# Patient Record
Sex: FEMALE | Race: BLACK OR AFRICAN AMERICAN | NOT HISPANIC OR LATINO | Employment: PART TIME | URBAN - METROPOLITAN AREA
[De-identification: names, ages, dates, MRNs, and addresses within clinical notes are randomized per-mention and may not be internally consistent; named-entity substitution may affect disease eponyms.]

---

## 2017-01-12 ENCOUNTER — APPOINTMENT (OUTPATIENT)
Dept: WOMENS IMAGING | Facility: HOSPITAL | Age: 57
End: 2017-01-12

## 2017-01-12 PROCEDURE — 77063 BREAST TOMOSYNTHESIS BI: CPT | Performed by: RADIOLOGY

## 2017-01-12 PROCEDURE — G0202 SCR MAMMO BI INCL CAD: HCPCS | Performed by: RADIOLOGY

## 2017-01-12 PROCEDURE — 77067 SCR MAMMO BI INCL CAD: CPT | Performed by: RADIOLOGY

## 2017-11-21 PROBLEM — G47.419 NARCOLEPSY: Status: ACTIVE | Noted: 2017-11-21

## 2018-10-24 ENCOUNTER — APPOINTMENT (OUTPATIENT)
Dept: WOMENS IMAGING | Facility: HOSPITAL | Age: 58
End: 2018-10-24

## 2018-10-24 PROCEDURE — 77067 SCR MAMMO BI INCL CAD: CPT | Performed by: RADIOLOGY

## 2018-10-24 PROCEDURE — 77063 BREAST TOMOSYNTHESIS BI: CPT | Performed by: RADIOLOGY

## 2020-08-07 ENCOUNTER — APPOINTMENT (OUTPATIENT)
Dept: WOMENS IMAGING | Facility: HOSPITAL | Age: 60
End: 2020-08-07

## 2020-08-07 PROCEDURE — 77067 SCR MAMMO BI INCL CAD: CPT | Performed by: RADIOLOGY

## 2020-08-07 PROCEDURE — 77063 BREAST TOMOSYNTHESIS BI: CPT | Performed by: RADIOLOGY

## 2021-01-26 ENCOUNTER — OFFICE VISIT (OUTPATIENT)
Dept: GASTROENTEROLOGY | Facility: CLINIC | Age: 61
End: 2021-01-26

## 2021-01-26 DIAGNOSIS — K31.84 GASTROPARESIS: Primary | ICD-10-CM

## 2021-01-26 DIAGNOSIS — K59.04 CHRONIC IDIOPATHIC CONSTIPATION: ICD-10-CM

## 2021-01-26 DIAGNOSIS — K21.9 GASTROESOPHAGEAL REFLUX DISEASE WITHOUT ESOPHAGITIS: ICD-10-CM

## 2021-01-26 DIAGNOSIS — R07.89 ATYPICAL CHEST PAIN: ICD-10-CM

## 2021-01-26 PROBLEM — R07.9 CHEST PAIN: Status: ACTIVE | Noted: 2018-11-30

## 2021-01-26 PROBLEM — K22.70 BARRETT'S ESOPHAGUS: Status: ACTIVE | Noted: 2021-01-26

## 2021-01-26 PROBLEM — K59.00 CONSTIPATION: Status: ACTIVE | Noted: 2021-01-26

## 2021-01-26 PROBLEM — I34.1 MITRAL VALVE PROLAPSE: Status: ACTIVE | Noted: 2021-01-26

## 2021-01-26 PROBLEM — R29.810 FACIAL DROOP: Status: ACTIVE | Noted: 2020-11-06

## 2021-01-26 PROBLEM — J45.909 ASTHMA: Status: ACTIVE | Noted: 2021-01-26

## 2021-01-26 PROBLEM — D21.9 FIBROID: Status: ACTIVE | Noted: 2019-12-06

## 2021-01-26 PROBLEM — M54.50 LOW BACK PAIN: Status: ACTIVE | Noted: 2021-01-26

## 2021-01-26 PROBLEM — K58.9 IRRITABLE BOWEL SYNDROME: Status: ACTIVE | Noted: 2021-01-26

## 2021-01-26 PROBLEM — M79.673 FOOT PAIN: Status: ACTIVE | Noted: 2021-01-26

## 2021-01-26 PROBLEM — I10 BENIGN ESSENTIAL HYPERTENSION: Status: ACTIVE | Noted: 2018-11-30

## 2021-01-26 PROBLEM — R13.10 DYSPHAGIA: Status: ACTIVE | Noted: 2021-01-26

## 2021-01-26 PROBLEM — G43.909 MIGRAINE HEADACHE: Status: ACTIVE | Noted: 2021-01-26

## 2021-01-26 PROBLEM — B18.2 CHRONIC HEPATITIS C VIRUS INFECTION (HCC): Status: ACTIVE | Noted: 2021-01-26

## 2021-01-26 PROCEDURE — 99443 PR PHYS/QHP TELEPHONE EVALUATION 21-30 MIN: CPT | Performed by: NURSE PRACTITIONER

## 2021-01-26 RX ORDER — HYDROCODONE BITARTRATE AND ACETAMINOPHEN 5; 325 MG/1; MG/1
TABLET ORAL
COMMUNITY

## 2021-01-26 RX ORDER — GALCANEZUMAB 120 MG/ML
INJECTION, SOLUTION SUBCUTANEOUS
COMMUNITY
Start: 2020-12-28

## 2021-01-26 RX ORDER — PROMETHAZINE HYDROCHLORIDE 25 MG/1
25 TABLET ORAL
COMMUNITY
Start: 2020-11-25

## 2021-01-26 RX ORDER — DEXLANSOPRAZOLE 60 MG/1
60 CAPSULE, DELAYED RELEASE ORAL DAILY
Qty: 90 CAPSULE | Refills: 3 | Status: SHIPPED | OUTPATIENT
Start: 2021-01-26 | End: 2021-12-20

## 2021-01-26 RX ORDER — AMLODIPINE BESYLATE 5 MG/1
5 TABLET ORAL DAILY
COMMUNITY
Start: 2020-11-09

## 2021-01-26 RX ORDER — RANITIDINE 150 MG/1
150 CAPSULE ORAL DAILY
COMMUNITY
End: 2021-01-26 | Stop reason: SINTOL

## 2021-01-26 RX ORDER — NAPROXEN 500 MG/1
500 TABLET ORAL
COMMUNITY
Start: 2020-11-19

## 2021-01-26 RX ORDER — METOPROLOL SUCCINATE 50 MG/1
50 TABLET, EXTENDED RELEASE ORAL DAILY
COMMUNITY
Start: 2020-11-24

## 2021-01-26 NOTE — PROGRESS NOTES
Chief Complaint   Patient presents with   • Follow-up     constipation, reflux           History of Present Illness  Patient presents today for follow-up.  She was a patient of our previous office with a history of colon polyps, Smith's esophagus, hepatitis C, atypical chest pain, splenic lesion, alternating bowel habits and gastroparesis.  She has had previous pH impedance testing consistent with nonacid reflux.    She has a history of mild delay in gastric emptying and has been on Reglan 5 mg twice daily for symptom management in the past.    She presents today for follow-up.  She reports worsening acid reflux and constipation over the past 3 to 5 weeks.  She has a bowel movement daily but does not feel empty.  Symptoms started December 2020.  She does strain at times to have a bowel movement.  She denies melena or hematochezia.    She has tried over-the-counter Senokot taking 2 pills daily for 2 days but did not notice symptom improvement.  She then started MiraLAX 2 capfuls for a couple of days and felt that this was helping so she discontinued.  She is currently having a bowel movement every day but does not feel empty.  She has not tried lower dose of MiraLAX.    Also worsening acid reflux.  She has been on Zantac in the past as well as Pepcid most recently however this was discontinued after hospitalization November 2020 for acute onset of vision changes with speech difficulty and associated nausea.  Patient with right-sided facial droop and imaging was unremarkable.  She was diagnosed with complicated migraine and has started injection medication for migraine.  He was most recently seen by Cristine SOLORZANO with neurology November 19, 2020.    Given recent hospitalization and symptoms most of her medications have been discontinued.  She has been on metoclopramide 5 mg at bedtime but ran out of the medication approximately 4 weeks ago and scheduled this appointment.    She reports worsening reflux that  wakes her up from sleep.  She reports increased weight gain at approximately 240 pounds which she thinks is attributing to worsening reflux.  She can experience esophageal spasms or chest pressure at times.  This comes and goes.  No shortness of breath, chest pain with exertion or fever.    Previous esophageal manometry with abnormal lower esophageal sphincter and normal upper esophageal sphincter and normal body consistent with ineffective peristalsis globally.  Previously treated with Levsin as well as dicyclomine.  Also imipramine for atypical chest pain.  Followed by Dr. Zimmerman for history of hepatitis C.  She reports that she has completed treatment and is in remission.    You have chosen to receive care through a telephone visit.   Do you consent to use a telephone visit for your medical care today? Yes    Review of Systems   Constitutional: Positive for appetite change and unexpected weight change.   Neurological: Positive for headaches.         Result Review :        December 6, 2018.  5 mm polyp in the hepatic flexure.  3 mm polyp in the descending colon (tubular adenoma), mild nonspecific change (lamina propria edema).  A few small mouth diverticula in the sigmoid colon.  Mild nonbleeding internal hemorrhoids.  December 6, 2018 EGD.  Irregular Z-line.  Biopsies with reflux type change negative for Smith's esophagus or dysplasia.  Small bowel biopsies with mild nonspecific changes negative for increased intraepithelial lymphocytes negative for villous blunting, granulomas or significant organism.    September 23, 2015.  pH impedance testing with predominantly nonacid reflux without symptom correlation.     Physical Exam - TELEPHONE VISIT      Assessment and Plan    Diagnoses and all orders for this visit:    1. Gastroparesis (Primary)  -     dexlansoprazole (Dexilant) 60 MG capsule; Take 1 capsule by mouth Daily.  Dispense: 90 capsule; Refill: 3    2. Gastroesophageal reflux disease without esophagitis  -      dexlansoprazole (Dexilant) 60 MG capsule; Take 1 capsule by mouth Daily.  Dispense: 90 capsule; Refill: 3    3. Chronic idiopathic constipation    4. Atypical chest pain       Start Dexilant for longstanding acid reflux, atypical chest pain, gastroparesis and early satiety, new prescription sent to pharmacy.  Also emailed rebate card to email address.    Start MiraLAX daily, adjust dose as needed to help promote more regular and complete bowel movements.    We will contact your neurologist to see if a medication like metoclopramide can be restarted however given hospitalization November 2020, would need neurology clearance prior to restarting metoclopramide even low-dose.    Patient has had extensive previous work-up for atypical chest pain including pH impedance monitoring and esophageal manometry.  She has been treated in the past for atypical chest pain with dicyclomine, Levsin and imipramine.  Again would hold off on any of these medications and would like to see if Dexilant provides improvement in symptoms prior to considering medication that may have systemic side effect that could be confused with symptoms from recent hospitalization November 2020 which was ultimately attributed to complex migraine.  She has had change in bowel habits with worsening constipation since her previous medication was discontinued after hospitalization November 2020.  Discussed causes including discontinuation of medication regimen.    I Will contact neurologist Evie Carmona however dietary modifications including weight loss, aggressive treatment for acid reflux and maintaining upright position for gastroparesis and acid reflux after eating were strongly recommended and I am not sure that metoclopramide will be approved by neurology and if it is not, this is understandable and will discuss with patient and arrange follow-up based on neurology recommendations.  Patient verbalized agreement and understanding, all  questions answered and support provided.      This visit has been rescheduled as a phone visit to comply with patient safety concerns in accordance with CDC recommendations. Total time of discussion was 29 minutes.    ADDENDUM: 2/1/2021: 330pm  Louis from [facility] called back to say that the patient can take the metoprolol and reglan.  Informed patient via telephone.  She has not restarted Dexilant yet.  Recommend restarting Dexilant and seeing if overall fatigue and GI symptoms improve.  Also discussed alternative causes of symptoms and encouraged her to follow-up with primary care provider.    If symptoms do not improve with Dexilant, to consider low-dose metoclopramide as discussed. Jenae      Follow Up   No follow-ups on file.    Patient Instructions   For history of colon polyps recommend colonoscopy in 5 years, December 2023.

## 2021-02-01 ENCOUNTER — TELEPHONE (OUTPATIENT)
Dept: GASTROENTEROLOGY | Facility: CLINIC | Age: 61
End: 2021-02-01

## 2021-02-02 NOTE — TELEPHONE ENCOUNTER
[facility] called to say patient was cleared to resume medications (see Whit's note) she is aware.

## 2021-03-26 ENCOUNTER — BULK ORDERING (OUTPATIENT)
Dept: CASE MANAGEMENT | Facility: OTHER | Age: 61
End: 2021-03-26

## 2021-03-26 DIAGNOSIS — Z23 IMMUNIZATION DUE: ICD-10-CM

## 2021-08-30 ENCOUNTER — APPOINTMENT (OUTPATIENT)
Dept: WOMENS IMAGING | Facility: HOSPITAL | Age: 61
End: 2021-08-30

## 2021-08-30 PROCEDURE — 77063 BREAST TOMOSYNTHESIS BI: CPT | Performed by: RADIOLOGY

## 2021-08-30 PROCEDURE — 77067 SCR MAMMO BI INCL CAD: CPT | Performed by: RADIOLOGY

## 2021-12-20 DIAGNOSIS — K21.9 GASTROESOPHAGEAL REFLUX DISEASE WITHOUT ESOPHAGITIS: ICD-10-CM

## 2021-12-20 DIAGNOSIS — K31.84 GASTROPARESIS: ICD-10-CM

## 2021-12-20 RX ORDER — DEXLANSOPRAZOLE 60 MG/1
CAPSULE, DELAYED RELEASE ORAL
Qty: 90 CAPSULE | Refills: 3 | Status: SHIPPED | OUTPATIENT
Start: 2021-12-20

## 2022-02-11 ENCOUNTER — TELEPHONE (OUTPATIENT)
Dept: GASTROENTEROLOGY | Facility: CLINIC | Age: 62
End: 2022-02-11

## 2022-02-11 DIAGNOSIS — K21.9 GASTRIC REFLUX: Primary | ICD-10-CM

## 2022-02-11 RX ORDER — SUCRALFATE 1 G/1
1 TABLET ORAL 3 TIMES DAILY
Qty: 90 TABLET | Refills: 2 | Status: SHIPPED | OUTPATIENT
Start: 2022-02-11

## 2022-02-11 NOTE — TELEPHONE ENCOUNTER
Sucralfate liquid not covered by insurance, patient informed via telephone, prescription for sucralfate tablet sent electronically to pharmacy.  Patient can crush in 1 inch of water.    Rashaad

## 2022-02-11 NOTE — TELEPHONE ENCOUNTER
Patient is having really bad chest pain and went to the ER and they gave her the GI cocktail and that eased the pain but when it wares off it comes back. She was wanting to know if she she can get some more Carafate liquid to help with that.

## 2022-10-28 ENCOUNTER — APPOINTMENT (OUTPATIENT)
Dept: WOMENS IMAGING | Facility: HOSPITAL | Age: 62
End: 2022-10-28

## 2022-10-28 PROCEDURE — 77063 BREAST TOMOSYNTHESIS BI: CPT | Performed by: RADIOLOGY

## 2022-10-28 PROCEDURE — 77067 SCR MAMMO BI INCL CAD: CPT | Performed by: RADIOLOGY

## 2022-11-18 ENCOUNTER — APPOINTMENT (OUTPATIENT)
Dept: WOMENS IMAGING | Facility: HOSPITAL | Age: 62
End: 2022-11-18

## 2022-11-18 PROCEDURE — 77061 BREAST TOMOSYNTHESIS UNI: CPT | Performed by: RADIOLOGY

## 2022-11-18 PROCEDURE — 77065 DX MAMMO INCL CAD UNI: CPT | Performed by: RADIOLOGY

## 2022-11-18 PROCEDURE — 76642 ULTRASOUND BREAST LIMITED: CPT | Performed by: RADIOLOGY

## 2022-11-18 PROCEDURE — G0279 TOMOSYNTHESIS, MAMMO: HCPCS | Performed by: RADIOLOGY

## 2023-08-08 DIAGNOSIS — K31.84 GASTROPARESIS: ICD-10-CM

## 2023-08-08 DIAGNOSIS — K21.9 GASTROESOPHAGEAL REFLUX DISEASE WITHOUT ESOPHAGITIS: ICD-10-CM

## 2023-08-08 RX ORDER — DEXLANSOPRAZOLE 60 MG/1
CAPSULE, DELAYED RELEASE ORAL
Qty: 90 CAPSULE | Refills: 3 | OUTPATIENT
Start: 2023-08-08

## 2025-01-19 ENCOUNTER — HOSPITAL ENCOUNTER (EMERGENCY)
Facility: HOSPITAL | Age: 65
Discharge: HOME | End: 2025-01-19
Attending: EMERGENCY MEDICINE | Admitting: EMERGENCY MEDICINE
Payer: MEDICAID

## 2025-01-19 ENCOUNTER — APPOINTMENT (OUTPATIENT)
Dept: CT IMAGING | Facility: HOSPITAL | Age: 65
End: 2025-01-19
Payer: MEDICAID

## 2025-01-19 VITALS
RESPIRATION RATE: 16 BRPM | DIASTOLIC BLOOD PRESSURE: 77 MMHG | OXYGEN SATURATION: 96 % | HEIGHT: 60 IN | SYSTOLIC BLOOD PRESSURE: 135 MMHG | WEIGHT: 235 LBS | TEMPERATURE: 98.4 F | HEART RATE: 86 BPM | BODY MASS INDEX: 46.13 KG/M2

## 2025-01-19 DIAGNOSIS — K59.00 CONSTIPATION, UNSPECIFIED CONSTIPATION TYPE: Primary | ICD-10-CM

## 2025-01-19 LAB
ALBUMIN SERPL-MCNC: 4.2 G/DL (ref 3.5–5.2)
ALBUMIN/GLOB SERPL: 1.1 G/DL
ALP SERPL-CCNC: 78 U/L (ref 39–117)
ALT SERPL W P-5'-P-CCNC: 11 U/L (ref 1–33)
ANION GAP SERPL CALCULATED.3IONS-SCNC: 10.6 MMOL/L (ref 5–15)
AST SERPL-CCNC: 11 U/L (ref 1–32)
BASOPHILS # BLD AUTO: 0.01 10*3/MM3 (ref 0–0.2)
BASOPHILS NFR BLD AUTO: 0.1 % (ref 0–1.5)
BILIRUB SERPL-MCNC: 0.9 MG/DL (ref 0–1.2)
BILIRUB UR QL STRIP: NEGATIVE
BUN SERPL-MCNC: 20 MG/DL (ref 8–23)
BUN/CREAT SERPL: 19 (ref 7–25)
CALCIUM SPEC-SCNC: 9.7 MG/DL (ref 8.6–10.5)
CHLORIDE SERPL-SCNC: 100 MMOL/L (ref 98–107)
CLARITY UR: CLEAR
CO2 SERPL-SCNC: 29.4 MMOL/L (ref 22–29)
COLOR UR: YELLOW
CREAT SERPL-MCNC: 1.05 MG/DL (ref 0.57–1)
DEPRECATED RDW RBC AUTO: 45.4 FL (ref 37–54)
EGFRCR SERPLBLD CKD-EPI 2021: 60.2 ML/MIN/1.73
EOSINOPHIL # BLD AUTO: 0.17 10*3/MM3 (ref 0–0.4)
EOSINOPHIL NFR BLD AUTO: 2 % (ref 0.3–6.2)
ERYTHROCYTE [DISTWIDTH] IN BLOOD BY AUTOMATED COUNT: 13 % (ref 12.3–15.4)
GLOBULIN UR ELPH-MCNC: 3.9 GM/DL
GLUCOSE SERPL-MCNC: 102 MG/DL (ref 65–99)
GLUCOSE UR STRIP-MCNC: NEGATIVE MG/DL
HCT VFR BLD AUTO: 47 % (ref 34–46.6)
HGB BLD-MCNC: 15.2 G/DL (ref 12–15.9)
HGB UR QL STRIP.AUTO: NEGATIVE
IMM GRANULOCYTES # BLD AUTO: 0 10*3/MM3 (ref 0–0.05)
IMM GRANULOCYTES NFR BLD AUTO: 0 % (ref 0–0.5)
KETONES UR QL STRIP: NEGATIVE
LEUKOCYTE ESTERASE UR QL STRIP.AUTO: NEGATIVE
LIPASE SERPL-CCNC: 21 U/L (ref 13–60)
LYMPHOCYTES # BLD AUTO: 2.6 10*3/MM3 (ref 0.7–3.1)
LYMPHOCYTES NFR BLD AUTO: 30.6 % (ref 19.6–45.3)
MCH RBC QN AUTO: 30.5 PG (ref 26.6–33)
MCHC RBC AUTO-ENTMCNC: 32.3 G/DL (ref 31.5–35.7)
MCV RBC AUTO: 94.2 FL (ref 79–97)
MONOCYTES # BLD AUTO: 0.42 10*3/MM3 (ref 0.1–0.9)
MONOCYTES NFR BLD AUTO: 4.9 % (ref 5–12)
NEUTROPHILS NFR BLD AUTO: 5.3 10*3/MM3 (ref 1.7–7)
NEUTROPHILS NFR BLD AUTO: 62.4 % (ref 42.7–76)
NITRITE UR QL STRIP: NEGATIVE
PH UR STRIP.AUTO: 5.5 [PH] (ref 5–8)
PLATELET # BLD AUTO: 289 10*3/MM3 (ref 140–450)
PMV BLD AUTO: 10.7 FL (ref 6–12)
POTASSIUM SERPL-SCNC: 3.4 MMOL/L (ref 3.5–5.2)
PROT SERPL-MCNC: 8.1 G/DL (ref 6–8.5)
PROT UR QL STRIP: NEGATIVE
RBC # BLD AUTO: 4.99 10*6/MM3 (ref 3.77–5.28)
SODIUM SERPL-SCNC: 140 MMOL/L (ref 136–145)
SP GR UR STRIP: 1.01 (ref 1–1.03)
UROBILINOGEN UR QL STRIP: NORMAL
WBC NRBC COR # BLD AUTO: 8.5 10*3/MM3 (ref 3.4–10.8)

## 2025-01-19 PROCEDURE — 25010000002 HYDROMORPHONE PER 4 MG: Performed by: NURSE PRACTITIONER

## 2025-01-19 PROCEDURE — 74177 CT ABD & PELVIS W/CONTRAST: CPT

## 2025-01-19 PROCEDURE — 81003 URINALYSIS AUTO W/O SCOPE: CPT | Performed by: EMERGENCY MEDICINE

## 2025-01-19 PROCEDURE — 99285 EMERGENCY DEPT VISIT HI MDM: CPT

## 2025-01-19 PROCEDURE — 36415 COLL VENOUS BLD VENIPUNCTURE: CPT

## 2025-01-19 PROCEDURE — 96374 THER/PROPH/DIAG INJ IV PUSH: CPT

## 2025-01-19 PROCEDURE — 83690 ASSAY OF LIPASE: CPT | Performed by: NURSE PRACTITIONER

## 2025-01-19 PROCEDURE — 25510000001 IOPAMIDOL PER 1 ML: Performed by: EMERGENCY MEDICINE

## 2025-01-19 PROCEDURE — 25010000002 KETOROLAC TROMETHAMINE PER 15 MG: Performed by: NURSE PRACTITIONER

## 2025-01-19 PROCEDURE — 85025 COMPLETE CBC W/AUTO DIFF WBC: CPT | Performed by: NURSE PRACTITIONER

## 2025-01-19 PROCEDURE — 96375 TX/PRO/DX INJ NEW DRUG ADDON: CPT

## 2025-01-19 PROCEDURE — 25010000002 ONDANSETRON PER 1 MG: Performed by: NURSE PRACTITIONER

## 2025-01-19 PROCEDURE — 99284 EMERGENCY DEPT VISIT MOD MDM: CPT | Performed by: NURSE PRACTITIONER

## 2025-01-19 PROCEDURE — 80053 COMPREHEN METABOLIC PANEL: CPT | Performed by: NURSE PRACTITIONER

## 2025-01-19 RX ORDER — IOPAMIDOL 755 MG/ML
100 INJECTION, SOLUTION INTRAVASCULAR
Status: COMPLETED | OUTPATIENT
Start: 2025-01-19 | End: 2025-01-19

## 2025-01-19 RX ORDER — KETOROLAC TROMETHAMINE 30 MG/ML
30 INJECTION, SOLUTION INTRAMUSCULAR; INTRAVENOUS ONCE
Status: COMPLETED | OUTPATIENT
Start: 2025-01-19 | End: 2025-01-19

## 2025-01-19 RX ORDER — ONDANSETRON 2 MG/ML
4 INJECTION INTRAMUSCULAR; INTRAVENOUS ONCE
Status: COMPLETED | OUTPATIENT
Start: 2025-01-19 | End: 2025-01-19

## 2025-01-19 RX ORDER — SODIUM CHLORIDE 0.9 % (FLUSH) 0.9 %
10 SYRINGE (ML) INJECTION AS NEEDED
Status: DISCONTINUED | OUTPATIENT
Start: 2025-01-19 | End: 2025-01-20 | Stop reason: HOSPADM

## 2025-01-19 RX ORDER — HYDROMORPHONE HYDROCHLORIDE 1 MG/ML
0.5 INJECTION, SOLUTION INTRAMUSCULAR; INTRAVENOUS; SUBCUTANEOUS ONCE
Status: COMPLETED | OUTPATIENT
Start: 2025-01-19 | End: 2025-01-19

## 2025-01-19 RX ADMIN — IOPAMIDOL 100 ML: 755 INJECTION, SOLUTION INTRAVENOUS at 22:03

## 2025-01-19 RX ADMIN — ONDANSETRON 4 MG: 2 INJECTION, SOLUTION INTRAMUSCULAR; INTRAVENOUS at 20:34

## 2025-01-19 RX ADMIN — HYDROMORPHONE HYDROCHLORIDE 0.5 MG: 1 INJECTION, SOLUTION INTRAMUSCULAR; INTRAVENOUS; SUBCUTANEOUS at 21:35

## 2025-01-19 RX ADMIN — KETOROLAC TROMETHAMINE 30 MG: 30 INJECTION, SOLUTION INTRAMUSCULAR at 20:33

## 2025-01-20 NOTE — FSED PROVIDER NOTE
Subjective   History of Present Illness  64 yr old female presents C/O Right side pain that radiates around and into her RLQ that started yesterday.  She does not have a gallbladder. She reports that she has fibroids in her uterus and has gotten a couple a sharp pain in the vaginal area but has not had any today.  She has sharp pain in the right flank area with movement.  Denies N/V.   No fever or chills. No problems with urination.  She states that she had a sharp pain in her lower ABD 3 days ago and was constipated and took a laxative yesterday and then moved her Bowels.  She has not had a bowel movement since yesterday and was wondering if the sharp pain in right flank could be constipation again.         Review of Systems   Constitutional: Negative.    Respiratory: Negative.     Gastrointestinal:  Positive for abdominal pain. Negative for abdominal distention, diarrhea, nausea and vomiting.   Genitourinary:  Positive for flank pain and vaginal pain. Negative for decreased urine volume, dysuria, frequency, hematuria, vaginal bleeding and vaginal discharge.   Neurological: Negative.        Past Medical History:   Diagnosis Date    Abdominal cramping     Abdominal pain of multiple sites     Abdominal pain, epigastric     Abnormal findings on examination of gastrointestinal tract     Acute diarrhea     Allergic rhinitis     Anal fissure     Anal hemorrhage     Anemia     Anxiety     Arthritis     Asthma     Atypical chest pain     Atypical nevus     Smith's esophagus     Bloating     Blood clotting disorder     Chest pain     Chronic pain     Chronic viral hepatitis C     Cognitive impairment     Colon cancer screening     Constipation     Dehydration     Depression     Diffuse esophageal spasm     Early satiety     Endometrial mass     Endometrial polyp     Esophageal reflux     Fatigue     Fecal incontinence     Fibroid uterus     Fibromyalgia     Flu vaccine need     Gastroenteritis, acute     Gastroparesis      GERD (gastroesophageal reflux disease)     Heartburn     History of colon polyps     History of colonic polyps     History of dysphagia     Hypertension     Irritable bowel syndrome     Liver enlargement     Lower back pain     Migraine     Mild cognitive impairment with memory loss     Moderate nausea     Nausea     Odynophagia     Pelvic pain in female     Prolactinoma, benign     RSD (reflex sympathetic dystrophy)     Sleep apnea     Throat burning     Umbilical hernia     UTI symptoms     Vaginal discharge     Viral pneumonia        Allergies   Allergen Reactions    Imipramine Hives and Itching    Neosporin Original  [Bacitracin-Neomycin-Polymyxin] Itching    Oxycodone Nausea And Vomiting    Piroxicam Hives and Swelling    Tyloxapol Hives and Swelling    Zinc Itching    Neomycin-Bacitracin Zn-Polymyx Itching and Swelling    Oxycodone-Acetaminophen Itching    Sulfa Antibiotics Itching     Neosporin or eyedrops only       Past Surgical History:   Procedure Laterality Date     SECTION      CHOLECYSTECTOMY      COLONOSCOPY  10/11/2013    (Dr. Godfrey) 2mm polyp in recto-sigmoid colon, otherwise normal exam; PATH: hyperplastic polyp.     GALLBLADDER SURGERY      UPPER GASTROINTESTINAL ENDOSCOPY  2015    (Dr. Balderas) make no changes consistant with short segment Smith's esophagus, chronic gastritis; PATH: neg biopsies.        Family History   Problem Relation Age of Onset    Anemia Other     Depression Other     Arthritis Other     Stroke Other     COPD Other     Hypertension Other     Intestinal polyp Neg Hx     Colon cancer Neg Hx     Colon polyps Neg Hx        Social History     Socioeconomic History    Marital status:    Tobacco Use    Smoking status: Never    Smokeless tobacco: Never   Substance and Sexual Activity    Alcohol use: Yes     Comment: social    Drug use: Never    Sexual activity: Defer           Objective   Physical Exam  Vitals and nursing note reviewed.   Constitutional:        Appearance: Normal appearance.   Pulmonary:      Effort: Pulmonary effort is normal.      Breath sounds: Normal breath sounds and air entry.   Abdominal:      General: Abdomen is protuberant. Bowel sounds are normal.      Palpations: Abdomen is soft.      Tenderness: There is abdominal tenderness in the right lower quadrant, periumbilical area and suprapubic area. There is right CVA tenderness. There is no left CVA tenderness, guarding or rebound. Negative signs include Wall's sign.      Comments: Pt sitting up and had right leg off stretcher on floor.  She went to move leg upward onto the stretcher and this caused sharp pain into her right flank and lower back area.   Skin:     General: Skin is warm and dry.      Capillary Refill: Capillary refill takes less than 2 seconds.   Neurological:      Mental Status: She is alert.         Procedures           ED Course   Toradol & Zofran given IV for pain.  No change in pain. Pt is drinking contrast for CT scan.  Reviewed lab results with her.   Dilaudid 0.5 given.  Pain is gone.  Reviewed CT results with patient.  Will DC home.  Return if symptoms worsen after mag citrate completed or new symptoms.                                       Medical Decision Making      Final diagnoses:   Constipation, unspecified constipation type       ED Disposition  ED Disposition       ED Disposition   Discharge    Condition   Stable    Comment   --               Liliya Desai MD  [address] [facility] [address] [phone]      As needed, If symptoms worsen         Medication List        New Prescriptions      magnesium citrate solution  Take 148 mL by mouth 1 (One) Time for 1 dose. If no results after 2-3 hours take rest of the bottle.               Where to Get Your Medications        These medications were sent to [address] - [address] [facility] - [phone]  - [phone] FX  [address] [facility]  [address]      Phone: [phone]   magnesium citrate solution

## 2025-01-20 NOTE — DISCHARGE INSTRUCTIONS
Take mag citrate as directed.  Follow up with your primary care as needed.  If after mag citrate your symptoms persist and fever > 100.4 return to the ER.

## 2025-02-03 ENCOUNTER — TELEPHONE (OUTPATIENT)
Dept: GASTROENTEROLOGY | Facility: CLINIC | Age: 65
End: 2025-02-03
Payer: COMMERCIAL

## 2025-02-03 NOTE — TELEPHONE ENCOUNTER
Patient calls stating that she was a patient of [facility] x 10 years until [facility] acquired the practice and she was forced to stay with a [facility] MD for tier one care.  She is going to be covered by [employer] at the end of February and would like to return to this practice.  Was told by scheduling dept she could not return.  She would like to speak with someone regarding this issue.

## 2025-05-01 ENCOUNTER — OFFICE VISIT (OUTPATIENT)
Dept: GASTROENTEROLOGY | Facility: CLINIC | Age: 65
End: 2025-05-01
Payer: COMMERCIAL

## 2025-05-01 VITALS
OXYGEN SATURATION: 97 % | WEIGHT: 233.3 LBS | BODY MASS INDEX: 45.8 KG/M2 | TEMPERATURE: 97.7 F | HEART RATE: 82 BPM | SYSTOLIC BLOOD PRESSURE: 116 MMHG | HEIGHT: 60 IN | DIASTOLIC BLOOD PRESSURE: 74 MMHG

## 2025-05-01 DIAGNOSIS — Z86.0100 HISTORY OF COLON POLYPS: ICD-10-CM

## 2025-05-01 DIAGNOSIS — Z98.890 STATUS POST DILATION OF ESOPHAGEAL NARROWING: ICD-10-CM

## 2025-05-01 DIAGNOSIS — Z87.19 STATUS POST DILATION OF ESOPHAGEAL NARROWING: ICD-10-CM

## 2025-05-01 DIAGNOSIS — K31.1 PYLORIC STENOSIS IN ADULT: ICD-10-CM

## 2025-05-01 DIAGNOSIS — K22.70 BARRETT'S ESOPHAGUS WITHOUT DYSPLASIA: ICD-10-CM

## 2025-05-01 DIAGNOSIS — K59.04 CHRONIC IDIOPATHIC CONSTIPATION: Primary | ICD-10-CM

## 2025-05-01 DIAGNOSIS — K21.9 GASTRIC REFLUX: ICD-10-CM

## 2025-05-01 DIAGNOSIS — K31.84 GASTROPARESIS: ICD-10-CM

## 2025-05-01 RX ORDER — ASPIRIN 81 MG/1
1 TABLET ORAL DAILY
COMMUNITY
Start: 2024-12-16

## 2025-05-01 RX ORDER — FUROSEMIDE 40 MG/1
40 TABLET ORAL
COMMUNITY
Start: 2024-11-06

## 2025-05-01 RX ORDER — PREGABALIN 25 MG/1
CAPSULE ORAL
COMMUNITY
Start: 2025-02-11

## 2025-05-01 RX ORDER — VONOPRAZAN FUMARATE 13.36 MG/1
10 TABLET ORAL DAILY
Qty: 90 TABLET | Refills: 2 | Status: SHIPPED | OUTPATIENT
Start: 2025-05-01

## 2025-05-01 RX ORDER — CYCLOBENZAPRINE HCL 10 MG
10 TABLET ORAL 3 TIMES DAILY PRN
COMMUNITY

## 2025-05-01 RX ORDER — HYDROCHLOROTHIAZIDE 25 MG/1
25 TABLET ORAL
COMMUNITY
Start: 2024-12-16

## 2025-05-01 RX ORDER — POTASSIUM CHLORIDE 1500 MG/1
20 TABLET, EXTENDED RELEASE ORAL
COMMUNITY
Start: 2024-12-16

## 2025-05-01 RX ORDER — VONOPRAZAN FUMARATE 13.36 MG/1
TABLET ORAL
COMMUNITY
Start: 2025-04-10 | End: 2025-05-01 | Stop reason: SDUPTHER

## 2025-05-01 RX ORDER — HYDRALAZINE HYDROCHLORIDE 50 MG/1
50 TABLET, FILM COATED ORAL
COMMUNITY
Start: 2024-12-16

## 2025-05-01 RX ORDER — SUCRALFATE 1 G/1
1 TABLET ORAL 3 TIMES DAILY
Qty: 90 TABLET | Refills: 2 | Status: SHIPPED | OUTPATIENT
Start: 2025-05-01

## 2025-05-01 NOTE — PATIENT INSTRUCTIONS
Treatment Plan:  Gastroparesis: Avoid nasal spray for gastroparesis, consider repeat emptying test or referral to a specialist if symptoms persist.  Constipation: Take MiraLAX and prune juice every other day, track bowel movements with a provided calendar, avoid probiotics.  GERD: Continue taking Voquezna, double the dose during flare-ups, use sucralfate tablets as needed, continue using Phenergan for nausea.  Weight management: Follow up with cardiologist in June 2025, discuss the HMR program, engage in regular physical activity, reduce portion sizes during meals.  Health Maintenance: Follow-up colonoscopy scheduled for June 2026, hepatitis C completely treated and resolved.     Follow-Up Instructions:  Take MiraLAX and prune juice every other day to regulate bowel movements.  Track bowel movements using the provided calendar.  Continue taking Voquezna for reflux and double the dose during flare-ups.  Use sucralfate tablets as needed, crush them if necessary.  Follow up with cardiologist in June 2025.  Discuss the HMR program with the cardiologist.  Engage in regular physical activity and reduce portion sizes during meals.  Schedule follow-up visit in 6 months.     Additional Notes:  She typically eats once a day and avoids dairy products, preferring coconut-based foods.  The patient has a nonprofit foundation and travels frequently.

## 2025-05-01 NOTE — PROGRESS NOTES
Chief Complaint   Patient presents with    Abdominal Pain             GASTROENTEROLOGY SUMMARY    64-year-old female presents today for follow-up.  Last office visit 1/26/2021, she was also a patient of our previous office.  She has been following with [facility] gastro[facility]ology, last office visit 8/28/2024 RASHAD Arreola.  History of colon polyps, Smith's esophagus, alternating constipation and diarrhea, atypical chest pain, mild gastroparesis, chronic nausea, acid reflux and history of hepatitis C treated with Dr. Zimmerman years ago (HCV quantitative RNA are - 11/18/2024).  Previous pH impedance testing consistent with nonacid reflux.  Previous esophageal manometry with abnormal lower esophageal sphincter and normal upper esophageal sphincter and normal body consistent with ineffective peristalsis globally.  Previously treated with Levsin as well as dicyclomine.   Also imipramine for atypical chest pain.   Previous cholecystectomy.  Previous colon transit study normal, anorectal manometry has been recommended in the past, patient has deferred.    Last EGD and colonoscopy 6/14/2023 Dr. Barragan (findings/pathology results summarized below):  Recommended for repeat EGD and colonoscopy at 3-year interval, 6/2026.    2/8/2025 CT scan abdomen pelvis:  Impression:   1. No acute findings in the abdomen or pelvis.   2. No nephrolithiasis.   3. Hepatomegaly.     Started on Voquenza September 5, 2024.  Previous use of Dexilant worked well but was no longer covered by insurance    Previous treatments for constipation:  MiraLAX and Linzess did not provide improvement in symptoms.  Has used Senokot 2 tablets as needed in the past with some success.  History of Present Illness  The patient is a 64-year-old female who presents today for follow-up.    Gastroparesis  She has been diagnosed with gastroparesis and experienced adverse reactions to Reglan, including swelling and twitching, leading to its  discontinuation.   Currently, she is not on any medication for gastroparesis and has not undergone a recent gastric emptying test.   She occasionally experiences nausea, which is less frequent than before.   She was previously prescribed anti-nausea medication due to opioid use but has not required it since her ER visit.   She reports no difficulty swallowing or sensation of food being stuck.   She has not been prescribed Wegovy or semaglutide due to potential exacerbation of her gastroparesis.  - Onset: Diagnosed with gastroparesis; adverse reactions to Reglan.  - Character: Occasional nausea, swelling, and twitching.  - Alleviating/Aggravating Factors: Discontinuation of Reglan; not on any medication for gastroparesis; avoidance of Wegovy or semaglutide.  - Severity: Less frequent nausea; no difficulty swallowing or sensation of food being stuck.    Constipation  She has been experiencing constipation, which worsens her back pain if not managed. She manages her bowel movements with Metamucil and MiraLAX, taking them every 3 to 4 days. She also uses prune juice in conjunction with MiraLAX, which induces bowel movements within an hour. Her primary care physician has advised her to increase her fiber intake. She has previously tried Linzess and MiraLAX alone, but neither provided relief.  - Onset: Ongoing constipation.  - Character: Constipation worsens back pain.  - Alleviating/Aggravating Factors: Managed with Metamucil, MiraLAX, and prune juice; advised to increase fiber intake.  - Timing: Takes Metamucil and MiraLAX every 3 to 4 days; prune juice induces bowel movements within an hour.  - Severity: Linzess and MiraLAX alone did not provide relief.    Reflux  She has been managing her reflux with Voquezna, which she finds effective.   She occasionally doubles her dose to 10 mg for 1 or 2 days during flare-ups, which typically resolves the issue.   She has previously used Pepcid and famotidine.   She has a history  of atypical chest pain and was last seen in the ER in 11/2024.   She has found liquid Carafate to be the most effective treatment for this condition, but due to insurance coverage issues, she now uses Carafate tablets as needed.  - Onset: History of atypical chest pain; last ER visit in 11/2024.  - Character: Reflux managed with Voquezna; occasional flare-ups.  - Alleviating/Aggravating Factors: Effective management with Voquezna; occasional doubling of dose; liquid Carafate most effective but uses tablets due to insurance issues.  - Timing: Doubles Voquezna dose for 1 or 2 days during flare-ups.  - Severity: Effective management with Voquezna; occasional flare-ups.    Weight Gain and Elevated A1c Levels  She has experienced significant weight gain and elevated A1c levels.   Her primary care physician has advised against weight loss medications due to her diastolic heart failure.   She is considering the HMR program at [facility], which she has found beneficial in the past.   However, she is concerned about the sodium content in prepackaged meals affecting her heart condition.   She resides in [place] and is unable to find a similar program locally.   She is not on a restricted sodium diet.   She typically eats once a day and does not consume large meals. She avoids dairy products and prefers coconut-based foods.  - Onset: Significant weight gain and elevated A1c levels.  - Character: Weight gain and elevated A1c levels.  - Alleviating/Aggravating Factors: Advised against weight loss medications; considering HMR program; concerned about sodium content in prepackaged meals.  - Timing: Typically eats once a day; avoids dairy products.  - Severity: Not on a restricted sodium diet.    PET Stress Test  She is scheduled for a PET stress test but has been unable to complete it due to illness and insurance issues.   She is scheduled to see her cardiologist again in 06/2025.  - Onset: Scheduled for PET stress test.  -  Alleviating/Aggravating Factors: Unable to complete due to illness and insurance issues.  - Timing: Scheduled to see cardiologist again in 06/2025.    PAST SURGICAL HISTORY:  Upper scope and colonoscopy on 06/14/2023 with removal of precancerous polyps and dilation of esophagus and pylorus.    SOCIAL HISTORY  Exercise: No exercise currently, but previously exercised in [place].  Diet: Eats out on weekends, tries to eat healthy, avoids dairy, consumes coconut-based products.          Result Review :       -EGD 12/20/2012: Esophageal mucosa changes. Acute Gastritis. Path: consistent with Smith's Esophagus, +IM.  -EGD 10/11/2013: Normal Esophagus, acute gastritis. No metaplasia  -Colonoscopy 10/11/2013: 2 mm sessile hyperplastic polyp in the recto-sigmoid colon.   -EGD 5/8/2015: Chronic Gastritis. Mild erosive gastropathy. No Dysplasia  -EGD 4/5/2017: Esophageal mucosa changes. Erythematous mucosa of stomach. Active gastritis. Mild reflux esophagitis, Negative for Dysplasia   - Colonoscopy 12/6/2018 Dr. Balderas:  5 mm polyp hepatic flexure- TA   3 mm polyp descending colon - TA  Small mouth diverticula sigmoid colon  Mild internal hemorrhoids    EGD and colonoscopy 6/14/2023 Dr. Barragan.  EGD:  - Tight circumferentially folded mucosa in the esophagus. Dilated.  - Z-line irregular, 38 cm from the incisors.  - Small hiatal hernia. Sliding type, noted only intermittently.  - Bilious gastric fluid in mild residual amounts.  - Erythematous and eroded mucosa in the antrum.   - One mucosal papule (nodule) found in the stomach.   - Gastric stenosis was found at the pylorus. Dilated to 15 mm-to consider further dilation/larger dilation if dyspeptic symptoms and gastroparesis symptoms are not adequately improved after dilation and dietary and lifestyle modifications.  Also to consider consultation with a surgeon experienced in pyloroplasty.  Colonoscopy:  - The examined portion of the ileum was normal.  - Normal mucosa in  "the entire examined colon.  - One 1 mm polyp in the cecum - TA  - Two 1 mm polyps in the transverse colon - TA  forceps. Resected and retrieved.  - One <1 mm polyp at the recto-sigmoid colon - colonic mucosa with focal hyperplastic change  - Mild non-bleeding internal hemorrhoids.  PATHOLOGY:   - Normal small bowel biopsies  - Gastric biopsy: Focal ulcer, chronic inactive gastritis with reactive changes, no H. pylori  -Lower esophageal biopsy mild chronic inflammation, reflux esophagitis, no IM  -Mid and proximal esophagus reactive changes and mild chronic inflammation  -Cecum polyp-TA  -Transverse colon polyps x 2-TA  -Rectosigmoid colon polyp-colonic mucosa with focal hyperplastic change  Recommended for repeat EGD and colonoscopy at 3-year interval, 6/2026.    -Gastric Emptying Study 8/24/2015 - Normal Gastric emptying after 2 hrs, delay in gastric emptying after 4 hrs.     REVIEWED PERTINENT RESULTS/ LABS  No results found for: \"CASEREPORT\", \"FINALDX\"  Lab Results   Component Value Date    HGB 15.2 01/19/2025    MCV 94.2 01/19/2025     01/19/2025    ALT 9 02/08/2025    AST 14 02/08/2025    HGBA1C 6.1 (H) 04/11/2025    INR 0.96 06/07/2023    TRIG 61 03/30/2023      Vital Signs:   /74   Pulse 82   Temp 97.7 °F (36.5 °C)   Ht 152.4 cm (60\")   Wt 106 kg (233 lb 4.8 oz)   SpO2 97%   BMI 45.56 kg/m²     Body mass index is 45.56 kg/m².     Physical Exam  Vitals reviewed.   Constitutional:       General: She is not in acute distress.     Appearance: Normal appearance. She is not ill-appearing or toxic-appearing.   Eyes:      General: No scleral icterus.  Pulmonary:      Effort: Pulmonary effort is normal. No respiratory distress.   Skin:     Coloration: Skin is not jaundiced.   Neurological:      Mental Status: She is alert and oriented to person, place, and time.   Psychiatric:         Mood and Affect: Mood normal.         Behavior: Behavior normal.         Thought Content: Thought content normal.    "      Judgment: Judgment normal.         Assessment and Plan        Diagnoses and all orders for this visit:    1. Chronic idiopathic constipation (Primary)    2. Smith's esophagus without dysplasia    3. Gastric reflux  -     sucralfate (CARAFATE) 1 g tablet; Take 1 tablet by mouth 3 (Three) Times a Day. Okay to crush in 1 inch of water and swallow  Dispense: 90 tablet; Refill: 2  -     Voquezna 10 MG tablet; Take 1 tablet by mouth Daily.  Dispense: 90 tablet; Refill: 2    4. History of colon polyps       Assessment & Plan  1. Gastroparesis:  - Previous gastric Emptying Study 8/24/2015 - Normal Gastric emptying after 2 hrs, delay in gastric emptying after 4 hrs.   - Experienced side effects from Reglan, including swelling and twitching, and was taken off the medication.  - Currently not on any medication for gastroparesis.  - Dilation of the esophagus and pylorus performed in 06/2023 may have contributed to a reduction in symptoms.  - Advised against using nasal spray for gastroparesis as it belongs to the same family as metoclopramide, which previously caused tremors.  - If symptoms of fullness, nausea, and heartburn persist, consider a repeat emptying test or referral to a specialist for further dilation.    2. Constipation:  - Experiences right lower quadrant pain that worsens with constipation.  - Advised to take MiraLAX and prune juice every other day to regulate bowel movements.  - Provided a blank calendar to track bowel movements.  - Advised to avoid probiotics as they may exacerbate bloating and pressure.    3. Gastroesophageal Reflux Disease (GERD):  Previous esophageal manometry with abnormal lower esophageal sphincter and normal upper esophageal sphincter and normal body consistent with ineffective peristalsis globally. Previously treated with Levsin as well as dicyclomine. Also imipramine for atypical chest pain.   - Currently taking Voquezna for reflux, which helps when taken consistently.  -  Occasionally doubles the dose to 20 mg for one or two days during flare-ups.  - Prescription for sucralfate tablets provided, with instructions to crush them if necessary.  - Advised to continue using Phenergan as needed for nausea, recommend lowest dose possible to control symptoms.    4. Weight management:  - Gained significant weight and has an elevated A1c.  - Advised against weight loss medications due to diastolic heart failure.  - Encouraged to follow up with cardiologist in 06/2025 and discuss the HMR program, which previously helped with weight loss.  - Advised to engage in regular physical activity and reduce portion sizes during meals.    5. Health Maintenance:  - Upper scope and colonoscopy on 06/14/2023, during which precancerous polyps were removed.  - Follow-up colonoscopy recommended 06/2026.  - Hepatitis C has been completely treated and resolved.    6. Diastolic heart failure:  - Scheduled for a PET stress test but has been unable to complete it due to illness and insurance issues.  - Scheduled to see cardiologist again in 06/2025.    Follow-up:  - Follow up in 6 months.       I spent 43 minutes caring for Bennie on this date of service. This time includes time spent by me in the following activities:preparing for the visit, reviewing tests, performing a medically appropriate examination and/or evaluation , counseling and educating the patient/family/caregiver, ordering medications, tests, or procedures, and documenting information in the medical record      Patient Instructions   Treatment Plan:  Gastroparesis: Avoid nasal spray for gastroparesis, consider repeat emptying test or referral to a specialist if symptoms persist.  Constipation: Take MiraLAX and prune juice every other day, track bowel movements with a provided calendar, avoid probiotics.  GERD: Continue taking Voquezna, double the dose during flare-ups, use sucralfate tablets as needed, continue using Phenergan for nausea.  Weight  management: Follow up with cardiologist in June 2025, discuss the HMR program, engage in regular physical activity, reduce portion sizes during meals.  Health Maintenance: Follow-up colonoscopy scheduled for June 2026, hepatitis C completely treated and resolved.     Follow-Up Instructions:  Take MiraLAX and prune juice every other day to regulate bowel movements.  Track bowel movements using the provided calendar.  Continue taking Voquezna for reflux and double the dose during flare-ups.  Use sucralfate tablets as needed, crush them if necessary.  Follow up with cardiologist in June 2025.  Discuss the HMR program with the cardiologist.  Engage in regular physical activity and reduce portion sizes during meals.  Schedule follow-up visit in 6 months.     Additional Notes:  She typically eats once a day and avoids dairy products, preferring coconut-based foods.  The patient has a nonprofit foundation and travels frequently.      Patient or patient representative verbalized consent for the use of Ambient Listening during the visit with  RASHAD Hemphill for chart documentation. 6/15/2025  08:47 EDT    EMR Dragon/Transcription Disclaimer:  This document has been Dictated utilizing Dragon dictation.

## 2025-06-17 ENCOUNTER — TELEPHONE (OUTPATIENT)
Dept: GASTROENTEROLOGY | Facility: CLINIC | Age: 65
End: 2025-06-17
Payer: COMMERCIAL